# Patient Record
Sex: FEMALE | Race: WHITE | NOT HISPANIC OR LATINO | Employment: UNEMPLOYED | RURAL
[De-identification: names, ages, dates, MRNs, and addresses within clinical notes are randomized per-mention and may not be internally consistent; named-entity substitution may affect disease eponyms.]

---

## 2020-08-19 ENCOUNTER — HISTORICAL (OUTPATIENT)
Dept: ADMINISTRATIVE | Facility: HOSPITAL | Age: 85
End: 2020-08-19

## 2022-10-07 ENCOUNTER — APPOINTMENT (OUTPATIENT)
Dept: RADIOLOGY | Facility: CLINIC | Age: 87
End: 2022-10-07
Attending: NURSE PRACTITIONER
Payer: MEDICARE

## 2022-10-07 ENCOUNTER — OFFICE VISIT (OUTPATIENT)
Dept: FAMILY MEDICINE | Facility: CLINIC | Age: 87
End: 2022-10-07
Payer: MEDICARE

## 2022-10-07 VITALS
HEART RATE: 66 BPM | WEIGHT: 117 LBS | RESPIRATION RATE: 18 BRPM | DIASTOLIC BLOOD PRESSURE: 71 MMHG | BODY MASS INDEX: 21.53 KG/M2 | HEIGHT: 62 IN | SYSTOLIC BLOOD PRESSURE: 125 MMHG | TEMPERATURE: 98 F | OXYGEN SATURATION: 96 %

## 2022-10-07 DIAGNOSIS — R93.0 ABNORMAL X-RAY OF PARANASAL SINUS: ICD-10-CM

## 2022-10-07 DIAGNOSIS — R51.9 NONINTRACTABLE HEADACHE, UNSPECIFIED CHRONICITY PATTERN, UNSPECIFIED HEADACHE TYPE: ICD-10-CM

## 2022-10-07 DIAGNOSIS — J32.9 CHRONIC CONGESTION OF PARANASAL SINUS: ICD-10-CM

## 2022-10-07 DIAGNOSIS — R51.9 NONINTRACTABLE HEADACHE, UNSPECIFIED CHRONICITY PATTERN, UNSPECIFIED HEADACHE TYPE: Primary | ICD-10-CM

## 2022-10-07 PROBLEM — F41.9 ANXIETY: Status: ACTIVE | Noted: 2022-10-07

## 2022-10-07 PROBLEM — M19.90 UNSPECIFIED OSTEOARTHRITIS, UNSPECIFIED SITE: Status: ACTIVE | Noted: 2022-10-07

## 2022-10-07 PROBLEM — I10 ESSENTIAL HYPERTENSION: Status: ACTIVE | Noted: 2022-10-07

## 2022-10-07 PROBLEM — M81.0 AGE-RELATED OSTEOPOROSIS WITHOUT CURRENT PATHOLOGICAL FRACTURE: Status: ACTIVE | Noted: 2022-10-07

## 2022-10-07 PROCEDURE — 70220 X-RAY EXAM OF SINUSES: CPT | Mod: TC,RHCUB | Performed by: NURSE PRACTITIONER

## 2022-10-07 PROCEDURE — 99203 OFFICE O/P NEW LOW 30 MIN: CPT | Mod: ,,, | Performed by: NURSE PRACTITIONER

## 2022-10-07 PROCEDURE — 70220 X-RAY EXAM OF SINUSES: CPT | Mod: 26,,, | Performed by: RADIOLOGY

## 2022-10-07 PROCEDURE — 70220 XR SINUSES MIN 3 VIEWS: ICD-10-PCS | Mod: 26,,, | Performed by: RADIOLOGY

## 2022-10-07 PROCEDURE — 99203 PR OFFICE/OUTPT VISIT, NEW, LEVL III, 30-44 MIN: ICD-10-PCS | Mod: ,,, | Performed by: NURSE PRACTITIONER

## 2022-10-07 RX ORDER — DENOSUMAB 60 MG/ML
INJECTION SUBCUTANEOUS
COMMUNITY
Start: 2022-09-16

## 2022-10-07 RX ORDER — PREDNISONE 10 MG/1
10 TABLET ORAL DAILY
Qty: 5 TABLET | Refills: 0 | Status: SHIPPED | OUTPATIENT
Start: 2022-10-07 | End: 2022-10-12

## 2022-10-07 RX ORDER — ASPIRIN 81 MG/1
81 TABLET ORAL
COMMUNITY

## 2022-10-07 RX ORDER — OMEPRAZOLE 40 MG/1
40 CAPSULE, DELAYED RELEASE ORAL DAILY
COMMUNITY
Start: 2022-09-30

## 2022-10-07 RX ORDER — ZOLPIDEM TARTRATE 5 MG/1
TABLET ORAL
COMMUNITY
Start: 2022-07-14

## 2022-10-07 RX ORDER — POTASSIUM CHLORIDE 20 MEQ/1
20 TABLET, EXTENDED RELEASE ORAL DAILY
COMMUNITY
Start: 2022-08-16

## 2022-10-07 RX ORDER — APIXABAN 2.5 MG/1
2.5 TABLET, FILM COATED ORAL 2 TIMES DAILY
COMMUNITY
Start: 2022-08-02

## 2022-10-07 RX ORDER — CALCIUM CARBONATE 600 MG
TABLET ORAL
COMMUNITY
Start: 2021-10-26

## 2022-10-07 RX ORDER — CEFDINIR 300 MG/1
300 CAPSULE ORAL 2 TIMES DAILY
Qty: 20 CAPSULE | Refills: 0 | Status: SHIPPED | OUTPATIENT
Start: 2022-10-07 | End: 2022-10-17

## 2022-10-07 RX ORDER — TOLTERODINE 2 MG/1
2 CAPSULE, EXTENDED RELEASE ORAL DAILY
COMMUNITY
Start: 2022-10-07

## 2022-10-07 RX ORDER — SERTRALINE HYDROCHLORIDE 100 MG/1
100 TABLET, FILM COATED ORAL DAILY
COMMUNITY
Start: 2022-08-31

## 2022-10-07 RX ORDER — ATORVASTATIN CALCIUM 40 MG/1
40 TABLET, FILM COATED ORAL DAILY
COMMUNITY
Start: 2022-06-15

## 2022-10-07 RX ORDER — AMLODIPINE BESYLATE 10 MG/1
10 TABLET ORAL DAILY
COMMUNITY
Start: 2022-09-30

## 2022-10-07 NOTE — PROGRESS NOTES
"Subjective:       Patient ID: Zenobia Rhodes is a 88 y.o. female.    Chief Complaint: Neck Pain (Neck pain and headache for several months.  States she has had MRI)    Presents to clinic as above. States Dr. Razo is her PCP. Has had neck pain and headaches with chronic sinusitis. She has had MRI's of neck and everything is normal.     Review of Systems   Constitutional: Negative.    HENT:  Positive for congestion and sinus pain. Negative for ear pain and sore throat.    Respiratory: Negative.     Cardiovascular: Negative.    Musculoskeletal:  Positive for myalgias and neck pain. Negative for back pain, falls and joint pain.   Neurological: Negative.         Reviewed family, medical, surgical, and social history.    Objective:      /71   Pulse 66   Temp 97.5 °F (36.4 °C)   Resp 18   Ht 5' 2" (1.575 m)   Wt 53.1 kg (117 lb)   SpO2 96%   BMI 21.40 kg/m²   Physical Exam  Vitals and nursing note reviewed.   Constitutional:       General: She is not in acute distress.     Appearance: Normal appearance. She is normal weight. She is not ill-appearing, toxic-appearing or diaphoretic.   HENT:      Head: Normocephalic.      Right Ear: Tympanic membrane, ear canal and external ear normal. There is no impacted cerumen.      Left Ear: Tympanic membrane, ear canal and external ear normal. There is no impacted cerumen.      Nose: Mucosal edema, congestion and rhinorrhea present. Rhinorrhea is clear.      Right Turbinates: Enlarged and swollen.      Left Turbinates: Enlarged and swollen.      Right Sinus: No maxillary sinus tenderness or frontal sinus tenderness.      Left Sinus: No maxillary sinus tenderness or frontal sinus tenderness.      Mouth/Throat:      Mouth: Mucous membranes are moist.      Pharynx: No oropharyngeal exudate or posterior oropharyngeal erythema.   Neck:      Vascular: No carotid bruit.   Cardiovascular:      Rate and Rhythm: Normal rate and regular rhythm.      Heart sounds: Normal heart " sounds.   Pulmonary:      Effort: Pulmonary effort is normal.      Breath sounds: Normal breath sounds.   Musculoskeletal:      Cervical back: Neck supple. Spasms and tenderness present. No swelling, edema, deformity, erythema, signs of trauma, lacerations, rigidity, torticollis, bony tenderness or crepitus. Pain with movement present. Decreased range of motion.      Comments: Pain with ROM cervical spine.    Lymphadenopathy:      Cervical: No cervical adenopathy.   Skin:     General: Skin is warm and dry.      Capillary Refill: Capillary refill takes less than 2 seconds.   Neurological:      Mental Status: She is alert and oriented to person, place, and time.   Psychiatric:         Mood and Affect: Mood normal.         Behavior: Behavior normal.         Thought Content: Thought content normal.         Judgment: Judgment normal.          No visits with results within 1 Day(s) from this visit.   Latest known visit with results is:   Lab Requisition on 05/18/2021   Component Date Value Ref Range Status    Color, UA 05/18/2021 Yellow  Colorless, Straw, Yellow, Dark Yellow Final    Clarity, UA 05/18/2021 Clear  Clear Final    pH, UA 05/18/2021 7.0  5.0, 5.5, 6.0, 6.5, 7.0, 7.5, 8.0 pH Units Final    Leukocytes, UA 05/18/2021 Negative  Negative Final    Nitrites, UA 05/18/2021 Negative  Negative Final    Protein, UA 05/18/2021 Negative  Negative Final    Glucose, UA 05/18/2021 Negative  Negative mg/dL Final    Ketones, UA 05/18/2021 Negative  Negative, Trace mg/dL Final    Urobilinogen, UA 05/18/2021 0.2  0.2, 1.0 mg/dL Final    Bilirubin, UA 05/18/2021 Negative  Negative Final    Blood, UA 05/18/2021 Negative  Negative Final    Specific Gravity, UA 05/18/2021 1.010  <=1.005, 1.010, 1.015, 1.020, 1.025, 1.030 Final    Culture, Urine 05/18/2021 6,000 Proteus vulgaris (A)   Final    Culture, Urine 05/18/2021 6,000 Escherichia coli (A)   Final      Assessment:       1. Nonintractable headache, unspecified chronicity  pattern, unspecified headache type    2. Chronic congestion of paranasal sinus    3. Abnormal x-ray of paranasal sinus        Plan:       Nonintractable headache, unspecified chronicity pattern, unspecified headache type  -     X-Ray Sinuses 3 or more views; Future; Expected date: 10/07/2022  -     cefdinir (OMNICEF) 300 MG capsule; Take 1 capsule (300 mg total) by mouth 2 (two) times daily. for 10 days  Dispense: 20 capsule; Refill: 0  -     Ambulatory referral/consult to ENT; Future; Expected date: 10/14/2022  -     CT Sinuses without Contrast; Future; Expected date: 10/07/2022    Chronic congestion of paranasal sinus  -     X-Ray Sinuses 3 or more views; Future; Expected date: 10/07/2022  -     cefdinir (OMNICEF) 300 MG capsule; Take 1 capsule (300 mg total) by mouth 2 (two) times daily. for 10 days  Dispense: 20 capsule; Refill: 0  -     predniSONE (DELTASONE) 10 MG tablet; Take 1 tablet (10 mg total) by mouth once daily. for 5 days  Dispense: 5 tablet; Refill: 0  -     Ambulatory referral/consult to ENT; Future; Expected date: 10/14/2022  -     CT Sinuses without Contrast; Future; Expected date: 10/07/2022    Abnormal x-ray of paranasal sinus  -     CT Sinuses without Contrast; Future; Expected date: 10/07/2022  I will call with xray results  F/U with referral clerk in 1 week  RTC PRN          Risks, benefits, and side effects were discussed with the patient. All questions were answered to the fullest satisfaction of the patient, and pt verbalized understanding and agreement to treatment plan. Pt was to call with any new or worsening symptoms, or present to the ER.

## 2022-10-08 NOTE — PROGRESS NOTES
Notify calcified structure in left frontal sinus. I am referring to ENT and getting a CT of sinuses. F/U with referral clerk in 1 week. Very important to keep these appointments so that we can possibly get some answers.

## 2022-10-12 ENCOUNTER — TELEPHONE (OUTPATIENT)
Dept: FAMILY MEDICINE | Facility: CLINIC | Age: 87
End: 2022-10-12
Payer: MEDICARE

## 2022-10-12 NOTE — TELEPHONE ENCOUNTER
Pt notified. Voiced understanding.----- Message from FLORECITA Keller sent at 10/7/2022  7:53 PM CDT -----  Notify calcified structure in left frontal sinus. I am referring to ENT and getting a CT of sinuses. F/U with referral clerk in 1 week. Very important to keep these appointments so that we can possibly get some answers.

## 2022-10-13 ENCOUNTER — OFFICE VISIT (OUTPATIENT)
Dept: OTOLARYNGOLOGY | Facility: CLINIC | Age: 87
End: 2022-10-13
Payer: MEDICARE

## 2022-10-13 VITALS — BODY MASS INDEX: 21.53 KG/M2 | WEIGHT: 117 LBS | HEIGHT: 62 IN

## 2022-10-13 DIAGNOSIS — H90.3 SENSORINEURAL HEARING LOSS (SNHL) OF BOTH EARS: Primary | ICD-10-CM

## 2022-10-13 DIAGNOSIS — R51.9 NONINTRACTABLE HEADACHE, UNSPECIFIED CHRONICITY PATTERN, UNSPECIFIED HEADACHE TYPE: ICD-10-CM

## 2022-10-13 DIAGNOSIS — J32.9 CHRONIC CONGESTION OF PARANASAL SINUS: ICD-10-CM

## 2022-10-13 PROCEDURE — 99204 OFFICE O/P NEW MOD 45 MIN: CPT | Mod: S$PBB,,, | Performed by: OTOLARYNGOLOGY

## 2022-10-13 PROCEDURE — 99204 PR OFFICE/OUTPT VISIT, NEW, LEVL IV, 45-59 MIN: ICD-10-PCS | Mod: S$PBB,,, | Performed by: OTOLARYNGOLOGY

## 2022-10-13 PROCEDURE — 99214 OFFICE O/P EST MOD 30 MIN: CPT | Mod: PBBFAC | Performed by: OTOLARYNGOLOGY

## 2022-10-13 NOTE — PROGRESS NOTES
Subjective:       Patient ID: Zenobia Rhodes is a 88 y.o. female.    Chief Complaint: Sinus Problem (Patient presents for a sinus infection. )    Sinus Problem  Associated symptoms include headaches and sinus pressure.   Review of Systems   HENT:  Positive for sinus pressure and sinus pain.    Neurological:  Positive for headaches.   All other systems reviewed and are negative.    Objective:      Physical Exam  General: NAD  Head: Normocephalic, atraumatic, no facial asymmetry/normal strength,  Ears: Both auricules normal in appearance, w/o deformities tympanic membranes normal external auditory canals normal  Nose: External nose w/o deformities normal turbinates no drainage or inflammation  Oral Cavity: Lips, gums, floor of mouth, tongue hard palate, and buccal mucosa without mass/lesion  Oropharynx: Mucosa pink and moist, soft palate, posterior pharynx and oropharyngeal wall without mass/lesion  Neck: Supple, symmetric, trachea midline, no palpable mass/lesion, no palpable cervical lymphadenopathy  Skin: Warm and dry, no concerning lesions  Respiratory: Respirations even, unlabored   Assessment:       1. Sensorineural hearing loss (SNHL) of both ears    2. Nonintractable headache, unspecified chronicity pattern, unspecified headache type    3. Chronic congestion of paranasal sinus          Plan:       Await CT sinus f/u after above

## 2022-10-17 ENCOUNTER — HOSPITAL ENCOUNTER (OUTPATIENT)
Dept: RADIOLOGY | Facility: HOSPITAL | Age: 87
Discharge: HOME | End: 2022-10-17
Attending: NURSE PRACTITIONER
Payer: MEDICARE

## 2022-10-17 ENCOUNTER — OFFICE VISIT (OUTPATIENT)
Dept: OTOLARYNGOLOGY | Facility: CLINIC | Age: 87
End: 2022-10-17
Payer: MEDICARE

## 2022-10-17 VITALS — BODY MASS INDEX: 21.53 KG/M2 | WEIGHT: 117 LBS | HEIGHT: 62 IN

## 2022-10-17 DIAGNOSIS — Z76.89 ENCOUNTER TO ESTABLISH CARE: Primary | ICD-10-CM

## 2022-10-17 DIAGNOSIS — G89.29 CHRONIC INTRACTABLE HEADACHE, UNSPECIFIED HEADACHE TYPE: ICD-10-CM

## 2022-10-17 DIAGNOSIS — R51.9 NONINTRACTABLE HEADACHE, UNSPECIFIED CHRONICITY PATTERN, UNSPECIFIED HEADACHE TYPE: ICD-10-CM

## 2022-10-17 DIAGNOSIS — J32.9 CHRONIC CONGESTION OF PARANASAL SINUS: ICD-10-CM

## 2022-10-17 DIAGNOSIS — R51.9 CHRONIC INTRACTABLE HEADACHE, UNSPECIFIED HEADACHE TYPE: ICD-10-CM

## 2022-10-17 DIAGNOSIS — R93.0 ABNORMAL X-RAY OF PARANASAL SINUS: ICD-10-CM

## 2022-10-17 DIAGNOSIS — J32.8 OTHER CHRONIC SINUSITIS: ICD-10-CM

## 2022-10-17 PROCEDURE — 99213 OFFICE O/P EST LOW 20 MIN: CPT | Mod: PBBFAC,25 | Performed by: OTOLARYNGOLOGY

## 2022-10-17 PROCEDURE — 70486 CT MAXILLOFACIAL W/O DYE: CPT | Mod: TC

## 2022-10-17 PROCEDURE — 99214 OFFICE O/P EST MOD 30 MIN: CPT | Mod: S$PBB,,, | Performed by: OTOLARYNGOLOGY

## 2022-10-17 PROCEDURE — 99214 PR OFFICE/OUTPT VISIT, EST, LEVL IV, 30-39 MIN: ICD-10-PCS | Mod: S$PBB,,, | Performed by: OTOLARYNGOLOGY

## 2022-10-17 PROCEDURE — 70486 CT MAXILLOFACIAL W/O DYE: CPT | Mod: 26,,, | Performed by: RADIOLOGY

## 2022-10-17 PROCEDURE — 70486 CT SINUSES WITHOUT CONTRAST: ICD-10-PCS | Mod: 26,,, | Performed by: RADIOLOGY

## 2022-10-17 NOTE — PROGRESS NOTES
Subjective:       Patient ID: Zenobia Rhodes is a 88 y.o. female.    Chief Complaint: Follow-up (Patient presents for CT sinus results. )    HPI  Review of Systems   Constitutional:  Negative for chills, fatigue and fever.   HENT:  Positive for nasal congestion. Negative for ear discharge and ear pain.    Neurological:  Positive for headaches.   All other systems reviewed and are negative.      Objective:      Physical Exam  Constitutional:       Appearance: Normal appearance.   HENT:      Head: Normocephalic.      Right Ear: Tympanic membrane, ear canal and external ear normal.      Left Ear: Tympanic membrane, ear canal and external ear normal.      Nose: Nose normal.      Mouth/Throat:      Lips: Pink.      Mouth: Mucous membranes are moist.      Pharynx: Oropharynx is clear.   Eyes:      Pupils: Pupils are equal, round, and reactive to light.   Pulmonary:      Effort: Pulmonary effort is normal.   Musculoskeletal:         General: Normal range of motion.   Skin:     General: Skin is warm and dry.   Neurological:      Mental Status: She is alert and oriented to person, place, and time.   Psychiatric:         Mood and Affect: Mood normal.         Behavior: Behavior is cooperative.       Assessment:       Problem List Items Addressed This Visit    None  Visit Diagnoses       Encounter to establish care    -  Primary    Relevant Orders    Ambulatory referral/consult to Neurology    Chronic intractable headache, unspecified headache type        Relevant Orders    Ambulatory referral/consult to Neurology    Other chronic sinusitis                Plan:   Reviewed CT. Old scarring from previous surgery left frontal sinus   Refer to neuro for chronic headaches.  Follow up as needed.

## 2022-11-09 ENCOUNTER — OFFICE VISIT (OUTPATIENT)
Dept: NEUROLOGY | Facility: CLINIC | Age: 87
End: 2022-11-09
Payer: MEDICARE

## 2022-11-09 VITALS
DIASTOLIC BLOOD PRESSURE: 78 MMHG | SYSTOLIC BLOOD PRESSURE: 120 MMHG | WEIGHT: 120 LBS | BODY MASS INDEX: 22.08 KG/M2 | HEART RATE: 75 BPM | OXYGEN SATURATION: 99 % | HEIGHT: 62 IN

## 2022-11-09 DIAGNOSIS — G89.29 CHRONIC INTRACTABLE HEADACHE, UNSPECIFIED HEADACHE TYPE: Primary | ICD-10-CM

## 2022-11-09 DIAGNOSIS — Z76.89 ENCOUNTER TO ESTABLISH CARE: ICD-10-CM

## 2022-11-09 DIAGNOSIS — R51.9 CHRONIC INTRACTABLE HEADACHE, UNSPECIFIED HEADACHE TYPE: Primary | ICD-10-CM

## 2022-11-09 PROCEDURE — 99215 OFFICE O/P EST HI 40 MIN: CPT | Mod: PBBFAC | Performed by: PSYCHIATRY & NEUROLOGY

## 2022-11-09 PROCEDURE — 99204 PR OFFICE/OUTPT VISIT, NEW, LEVL IV, 45-59 MIN: ICD-10-PCS | Mod: S$PBB,,, | Performed by: PSYCHIATRY & NEUROLOGY

## 2022-11-09 PROCEDURE — 99204 OFFICE O/P NEW MOD 45 MIN: CPT | Mod: S$PBB,,, | Performed by: PSYCHIATRY & NEUROLOGY

## 2022-11-09 RX ORDER — KETOROLAC TROMETHAMINE 10 MG/1
10 TABLET, FILM COATED ORAL EVERY 6 HOURS PRN
Qty: 20 TABLET | Refills: 0 | Status: SHIPPED | OUTPATIENT
Start: 2022-11-09 | End: 2022-11-14

## 2022-11-09 NOTE — PATIENT INSTRUCTIONS
Excellent family support network from daughter   Complete course Abx   Caution w/ excess OTC meds - consider tapering off Ambien  Trial Toradol x 4-5 days  F/u 3 months

## 2022-11-09 NOTE — PROGRESS NOTES
Subjective:       Patient ID: Zenobai Rhodes is a 88 y.o. female     Chief Complaint:    Chief Complaint   Patient presents with    Headache        Allergies:  Patient has no known allergies.    Current Medications:    Outpatient Encounter Medications as of 11/9/2022   Medication Sig Dispense Refill    amLODIPine (NORVASC) 10 MG tablet Take 10 mg by mouth once daily.      aspirin (ECOTRIN) 81 MG EC tablet Take 81 mg by mouth.      atorvastatin (LIPITOR) 40 MG tablet Take 40 mg by mouth once daily.      calcium carbonate (OS-ERICKA) 600 mg calcium (1,500 mg) Tab Calcium Carbonate 600 MG Oral Tablet QTY: 0 tablet Days: 0 Refills: 0  Written: 10/26/21 Patient Instructions: Two po qd      denosumab (PROLIA) 60 mg/mL Syrg Prolia 60 MG/ML Subcutaneous Solution Prefilled Syringe QTY: 0  Days: 0 Refills: 0  Written: 09/16/22 Patient Instructions: twice a year      ELIQUIS 2.5 mg Tab Take 2.5 mg by mouth 2 (two) times daily.      omeprazole (PRILOSEC) 40 MG capsule Take 40 mg by mouth once daily.      potassium chloride SA (K-DUR,KLOR-CON) 20 MEQ tablet Take 20 mEq by mouth once daily.      sertraline (ZOLOFT) 100 MG tablet Take 100 mg by mouth once daily.      tolterodine (DETROL LA) 2 MG Cp24 Take 2 mg by mouth once daily.      zolpidem (AMBIEN) 5 MG Tab Take by mouth.       No facility-administered encounter medications on file as of 11/9/2022.       History of Present Illness  87 yo WF s/p stroke 15 mos ago and referred for HEADACHE's generalized in nature and several months worsening   Has only taken OTC meds w/ mod success   Now being treated for sinus infection but CT sinus and head showed L frontal sinus opacification and started on ABx       History reviewed. No pertinent past medical history.    History reviewed. No pertinent surgical history.    Social History  Ms. Rhodes  reports that she has never smoked. She has never been exposed to tobacco smoke. She has never used smokeless tobacco. She reports that  "she does not currently use alcohol. She reports that she does not use drugs.    Family History  Ms.'donna Rhodes family history is not on file.    Review of Systems  Review of Systems   All other systems reviewed and are negative.   Objective:   /78 (BP Location: Left arm, Patient Position: Sitting, BP Method: Medium (Manual))   Pulse 75   Ht 5' 2" (1.575 m)   Wt 54.4 kg (120 lb)   SpO2 99%   BMI 21.95 kg/m²    NEUROLOGICAL EXAMINATION:     MENTAL STATUS   Oriented to person, place, and time.   Attention: decreased. Concentration: decreased.   Speech: speech is normal   Level of consciousness: drowsy  Knowledge: good.     CRANIAL NERVES   Cranial nerves II through XII intact.     MOTOR EXAM     Strength   Strength 5/5 throughout.     GAIT AND COORDINATION        Uses walker       Physical Exam  Vitals reviewed.   Neurological:      General: No focal deficit present.      Mental Status: She is alert and oriented to person, place, and time. Mental status is at baseline.      Cranial Nerves: Cranial nerves 2-12 are intact.      Motor: Motor strength is normal.   Psychiatric:         Speech: Speech normal.        Assessment:     Chronic intractable headache, unspecified headache type  -     Ambulatory referral/consult to Neurology    Encounter to establish care  -     Ambulatory referral/consult to Neurology       Primary Diagnosis and ICD10  Chronic intractable headache, unspecified headache type [R51.9, G89.29]    Plan:     Patient Instructions   Excellent family support network from daughter   Complete course Abx   Caution w/ excess OTC meds - consider tapering off Ambien  Trial Toradol x 4-5 days  F/u 3 months     There are no discontinued medications.    Requested Prescriptions      No prescriptions requested or ordered in this encounter         "

## 2023-02-28 ENCOUNTER — OFFICE VISIT (OUTPATIENT)
Dept: OTOLARYNGOLOGY | Facility: CLINIC | Age: 88
End: 2023-02-28
Payer: MEDICARE

## 2023-02-28 VITALS — BODY MASS INDEX: 22.08 KG/M2 | WEIGHT: 120 LBS | HEIGHT: 62 IN

## 2023-02-28 DIAGNOSIS — J32.9 CHRONIC CONGESTION OF PARANASAL SINUS: Primary | ICD-10-CM

## 2023-02-28 DIAGNOSIS — R09.81 CHRONIC NASAL CONGESTION: ICD-10-CM

## 2023-02-28 PROCEDURE — 99213 OFFICE O/P EST LOW 20 MIN: CPT | Mod: PBBFAC | Performed by: OTOLARYNGOLOGY

## 2023-02-28 PROCEDURE — 99214 PR OFFICE/OUTPT VISIT, EST, LEVL IV, 30-39 MIN: ICD-10-PCS | Mod: S$PBB,,, | Performed by: OTOLARYNGOLOGY

## 2023-02-28 PROCEDURE — 99214 OFFICE O/P EST MOD 30 MIN: CPT | Mod: S$PBB,,, | Performed by: OTOLARYNGOLOGY

## 2023-02-28 RX ORDER — AZELASTINE 1 MG/ML
2 SPRAY, METERED NASAL 2 TIMES DAILY
Qty: 30 ML | Refills: 1 | Status: SHIPPED | OUTPATIENT
Start: 2023-02-28 | End: 2023-06-17

## 2023-02-28 NOTE — PROGRESS NOTES
Subjective:       Patient ID: Zenobia Rhodes is a 89 y.o. female.    Chief Complaint: Sinus Problem (Patient presents for sinus pressure. )    HPI  Review of Systems   Constitutional:  Negative for chills, fatigue and fever.   HENT:  Positive for nasal congestion and sinus pressure/congestion.    Respiratory:  Positive for cough.        Objective:      Physical Exam  Constitutional:       Appearance: Normal appearance.   HENT:      Head: Normocephalic.      Right Ear: Tympanic membrane, ear canal and external ear normal.      Left Ear: Tympanic membrane, ear canal and external ear normal.      Nose: Nose normal.      Mouth/Throat:      Lips: Pink.      Mouth: Mucous membranes are moist.      Pharynx: Oropharynx is clear.   Eyes:      Pupils: Pupils are equal, round, and reactive to light.   Pulmonary:      Effort: Pulmonary effort is normal.   Skin:     General: Skin is warm and dry.   Neurological:      Mental Status: She is alert and oriented to person, place, and time.   Psychiatric:         Mood and Affect: Mood normal.         Behavior: Behavior is cooperative.       Assessment:       Problem List Items Addressed This Visit    None  Visit Diagnoses       Chronic congestion of paranasal sinus    -  Primary    Relevant Medications    azelastine (ASTELIN) 137 mcg (0.1 %) nasal spray    Chronic nasal congestion                Plan:   Stop Sudafed  Astelin  Follow up in 2-3 weeks.

## 2024-08-16 DIAGNOSIS — R41.0 CONFUSED: ICD-10-CM

## 2024-08-16 DIAGNOSIS — Z86.73 HISTORY OF CVA (CEREBROVASCULAR ACCIDENT): Primary | ICD-10-CM

## 2024-10-11 ENCOUNTER — OFFICE VISIT (OUTPATIENT)
Dept: NEUROLOGY | Facility: CLINIC | Age: 89
End: 2024-10-11
Payer: MEDICARE

## 2024-10-11 VITALS
WEIGHT: 110 LBS | HEART RATE: 65 BPM | BODY MASS INDEX: 20.24 KG/M2 | HEIGHT: 62 IN | SYSTOLIC BLOOD PRESSURE: 102 MMHG | RESPIRATION RATE: 16 BRPM | DIASTOLIC BLOOD PRESSURE: 70 MMHG | OXYGEN SATURATION: 97 %

## 2024-10-11 DIAGNOSIS — R41.3 MEMORY CHANGES: Primary | ICD-10-CM

## 2024-10-11 DIAGNOSIS — Z86.73 HISTORY OF CVA (CEREBROVASCULAR ACCIDENT): ICD-10-CM

## 2024-10-11 DIAGNOSIS — F41.9 ANXIETY: ICD-10-CM

## 2024-10-11 PROCEDURE — 99214 OFFICE O/P EST MOD 30 MIN: CPT | Mod: S$PBB,,, | Performed by: PSYCHIATRY & NEUROLOGY

## 2024-10-11 PROCEDURE — 99999 PR PBB SHADOW E&M-EST. PATIENT-LVL V: CPT | Mod: PBBFAC,,, | Performed by: PSYCHIATRY & NEUROLOGY

## 2024-10-11 PROCEDURE — 99215 OFFICE O/P EST HI 40 MIN: CPT | Mod: PBBFAC | Performed by: PSYCHIATRY & NEUROLOGY

## 2024-10-11 RX ORDER — DONEPEZIL HYDROCHLORIDE 5 MG/1
5 TABLET, FILM COATED ORAL DAILY
Qty: 90 TABLET | Refills: 3 | Status: SHIPPED | OUTPATIENT
Start: 2024-10-11

## 2024-10-11 RX ORDER — QUETIAPINE FUMARATE 25 MG/1
25 TABLET, FILM COATED ORAL DAILY
COMMUNITY
End: 2024-10-11

## 2024-10-11 RX ORDER — IBUPROFEN 100 MG/5ML
SUSPENSION, ORAL (FINAL DOSE FORM) ORAL
COMMUNITY
Start: 2022-09-16

## 2024-10-11 NOTE — PATIENT INSTRUCTIONS
Excellent family support from daughter   Will offer trial of memory aids Aricept 5 mg daily   Stop Seroquel as not  tolerating   Cont Ambien but only by itself   F/u 3 months

## 2024-10-11 NOTE — PROGRESS NOTES
"    Subjective:       Patient ID: Zenobia Rhodes is a 89 y.o. female     Chief Complaint:    Chief Complaint   Patient presents with    memory / headache     Pt. States confusion sometimes.pt. states pain in eyes.        Allergies:  Patient has no known allergies.    Current Medications:    Outpatient Encounter Medications as of 10/11/2024   Medication Sig Dispense Refill    amLODIPine (NORVASC) 10 MG tablet Take 10 mg by mouth once daily.      aspirin (ECOTRIN) 81 MG EC tablet Take 81 mg by mouth.      atorvastatin (LIPITOR) 40 MG tablet Take 40 mg by mouth once daily.      azelastine (ASTELIN) 137 mcg (0.1 %) nasal spray 2 sprays (274 mcg total) by Nasal route 2 (two) times daily. 30 mL 1    calcium carbonate (OS-ERICKA) 600 mg calcium (1,500 mg) Tab Calcium Carbonate 600 MG Oral Tablet QTY: 0 tablet Days: 0 Refills: 0  Written: 10/26/21 Patient Instructions: Two po qd      denosumab (PROLIA) 60 mg/mL Syrg Prolia 60 MG/ML Subcutaneous Solution Prefilled Syringe QTY: 0  Days: 0 Refills: 0  Written: 09/16/22 Patient Instructions: twice a year      ELIQUIS 2.5 mg Tab Take 2.5 mg by mouth 2 (two) times daily.      omeprazole (PRILOSEC) 40 MG capsule Take 40 mg by mouth once daily.      potassium chloride SA (K-DUR,KLOR-CON) 20 MEQ tablet Take 20 mEq by mouth once daily.      sertraline (ZOLOFT) 100 MG tablet Take 100 mg by mouth once daily.      tolterodine (DETROL LA) 2 MG Cp24 Take 2 mg by mouth once daily.      zolpidem (AMBIEN) 5 MG Tab Take by mouth.       No facility-administered encounter medications on file as of 10/11/2024.       History of Present Illness  89 yo WF in clinic for eval of worsening confusion poss dementia and understandable given age   She has been confused recently and "sundowning" at night wandering outside   S/p stroke 3 yrs ago and seen in this clinic 2 yrs ago for HEADACHE's relieved after Toradol injec- never followed up   Excellent support form daughter who has noticed short term " "memory issues   Trial of Ambien has helped her sleep when taken by itself however Seroquel by itself caused incr confusion and dizziness   She is also having hallucinations seeing and hearing people who are not there         History reviewed. No pertinent past medical history.    History reviewed. No pertinent surgical history.    Social History  Ms. Rhodes  reports that she has never smoked. She has never been exposed to tobacco smoke. She has never used smokeless tobacco. She reports that she does not currently use alcohol. She reports that she does not use drugs.    Family History  Ms.'s Rhodes family history is not on file.    Review of Systems  Review of Systems   Musculoskeletal:  Positive for joint pain.   Neurological:  Positive for weakness.   Psychiatric/Behavioral:  Positive for hallucinations and memory loss. The patient is nervous/anxious.    All other systems reviewed and are negative.     Objective:   /70   Pulse 65   Resp 16   Ht 5' 2" (1.575 m)   Wt 49.9 kg (110 lb)   SpO2 97%   BMI 20.12 kg/m²    NEUROLOGICAL EXAMINATION:     MENTAL STATUS   Oriented to person, place, and time.   Registration: recalls 2 of 3 objects.   Level of consciousness: alert  Knowledge: consistent with education.        Knew POTUS  Poor serial 7's      CRANIAL NERVES   Cranial nerves II through XII intact.     MOTOR EXAM     Strength   Strength 5/5 throughout.     GAIT AND COORDINATION        Slow using walker         Physical Exam  Vitals reviewed.   Constitutional:       Appearance: She is normal weight.   Neurological:      Mental Status: She is alert and oriented to person, place, and time. Mental status is at baseline.      Cranial Nerves: Cranial nerves 2-12 are intact.      Motor: Motor strength is normal.         Assessment:     Memory changes  Comments:  poss dementia    History of CVA (cerebrovascular accident)  -     Ambulatory referral/consult to Neurology    Anxiety         Primary Diagnosis " and ICD10  Memory changes [R41.3]    Plan:     There are no Patient Instructions on file for this visit.    There are no discontinued medications.    Requested Prescriptions      No prescriptions requested or ordered in this encounter

## 2025-01-13 ENCOUNTER — OFFICE VISIT (OUTPATIENT)
Dept: NEUROLOGY | Facility: CLINIC | Age: OVER 89
End: 2025-01-13
Payer: MEDICARE

## 2025-01-13 VITALS
HEIGHT: 62 IN | WEIGHT: 110 LBS | OXYGEN SATURATION: 95 % | DIASTOLIC BLOOD PRESSURE: 86 MMHG | RESPIRATION RATE: 18 BRPM | BODY MASS INDEX: 20.24 KG/M2 | SYSTOLIC BLOOD PRESSURE: 132 MMHG | HEART RATE: 72 BPM

## 2025-01-13 DIAGNOSIS — F03.90 DEMENTIA, UNSPECIFIED DEMENTIA SEVERITY, UNSPECIFIED DEMENTIA TYPE, UNSPECIFIED WHETHER BEHAVIORAL, PSYCHOTIC, OR MOOD DISTURBANCE OR ANXIETY: Primary | ICD-10-CM

## 2025-01-13 DIAGNOSIS — F41.9 ANXIETY: ICD-10-CM

## 2025-01-13 PROCEDURE — 99999 PR PBB SHADOW E&M-EST. PATIENT-LVL V: CPT | Mod: PBBFAC,,, | Performed by: PSYCHIATRY & NEUROLOGY

## 2025-01-13 PROCEDURE — 99215 OFFICE O/P EST HI 40 MIN: CPT | Mod: PBBFAC | Performed by: PSYCHIATRY & NEUROLOGY

## 2025-01-13 PROCEDURE — 99214 OFFICE O/P EST MOD 30 MIN: CPT | Mod: S$PBB,,, | Performed by: PSYCHIATRY & NEUROLOGY

## 2025-01-13 RX ORDER — DONEPEZIL HYDROCHLORIDE 5 MG/1
5 TABLET, FILM COATED ORAL DAILY
Qty: 90 TABLET | Refills: 3 | Status: SHIPPED | OUTPATIENT
Start: 2025-01-13

## 2025-01-13 NOTE — PROGRESS NOTES
Subjective:       Patient ID: Zenobia Rhodes is a 89 y.o. female     Chief Complaint:    Chief Complaint   Patient presents with    Follow-up     Patient is a 3 month follow up r/t movement disorders. Requests Donepezil 5 mg.         Allergies:  Patient has no known allergies.    Current Medications:    Outpatient Encounter Medications as of 1/13/2025   Medication Sig Dispense Refill    amLODIPine (NORVASC) 10 MG tablet Take 10 mg by mouth once daily.      ascorbic acid, vitamin C, (VITAMIN C) 1000 MG tablet Take by oral route.      aspirin (ECOTRIN) 81 MG EC tablet Take 81 mg by mouth.      atorvastatin (LIPITOR) 40 MG tablet Take 40 mg by mouth once daily.      azelastine (ASTELIN) 137 mcg (0.1 %) nasal spray 2 sprays (274 mcg total) by Nasal route 2 (two) times daily. 30 mL 1    calcium carbonate (OS-ERICKA) 600 mg calcium (1,500 mg) Tab Calcium Carbonate 600 MG Oral Tablet QTY: 0 tablet Days: 0 Refills: 0  Written: 10/26/21 Patient Instructions: Two po qd      denosumab (PROLIA) 60 mg/mL Syrg Prolia 60 MG/ML Subcutaneous Solution Prefilled Syringe QTY: 0  Days: 0 Refills: 0  Written: 09/16/22 Patient Instructions: twice a year      donepeziL (ARICEPT) 5 MG tablet Take 1 tablet (5 mg total) by mouth once daily. 90 tablet 3    ELIQUIS 2.5 mg Tab Take 2.5 mg by mouth 2 (two) times daily.      omeprazole (PRILOSEC) 40 MG capsule Take 40 mg by mouth once daily.      potassium chloride SA (K-DUR,KLOR-CON) 20 MEQ tablet Take 20 mEq by mouth once daily.      sertraline (ZOLOFT) 100 MG tablet Take 100 mg by mouth once daily.      tolterodine (DETROL LA) 2 MG Cp24 Take 2 mg by mouth once daily.      zolpidem (AMBIEN) 5 MG Tab Take by mouth.       No facility-administered encounter medications on file as of 1/13/2025.       History of Present Illness  89 yo WF w/ dementia and tolerating Aricept well but has stopped Ambien - Seroquel bedtime but cannot guarantee compliance  Some prev wandering but not  "per daughter currently  Excellent family support network from daughter        History reviewed. No pertinent past medical history.    History reviewed. No pertinent surgical history.    Social History  Ms. Rhodes  reports that she has never smoked. She has never been exposed to tobacco smoke. She has never used smokeless tobacco. She reports that she does not currently use alcohol. She reports that she does not use drugs.    Family History  Ms.'s Rhodes family history is not on file.    Review of Systems  Review of Systems   Psychiatric/Behavioral:  Positive for memory loss.    All other systems reviewed and are negative.   Objective:   /86 (BP Location: Right arm, Patient Position: Sitting)   Pulse 72   Resp 18   Ht 5' 2" (1.575 m)   Wt 49.9 kg (110 lb 0.2 oz)   SpO2 95%   BMI 20.12 kg/m²    NEUROLOGICAL EXAMINATION:     MENTAL STATUS   Level of consciousness: alert  Knowledge: consistent with education.        dementia     CRANIAL NERVES   Cranial nerves II through XII intact.     MOTOR EXAM     Strength   Strength 5/5 throughout.     GAIT AND COORDINATION        Walker at house-wheelchair in public       Physical Exam  Vitals reviewed.   Constitutional:       Appearance: She is normal weight.   Neurological:      Mental Status: She is alert. Mental status is at baseline.      Cranial Nerves: Cranial nerves 2-12 are intact.      Motor: Motor strength is normal.       Assessment:     Anxiety         Primary Diagnosis and ICD10  Anxiety [F41.9]    Plan:     There are no Patient Instructions on file for this visit.    There are no discontinued medications.    Requested Prescriptions      No prescriptions requested or ordered in this encounter       "

## 2025-04-28 ENCOUNTER — OFFICE VISIT (OUTPATIENT)
Dept: NEUROLOGY | Facility: CLINIC | Age: OVER 89
End: 2025-04-28
Payer: MEDICARE

## 2025-04-28 VITALS
BODY MASS INDEX: 17.48 KG/M2 | SYSTOLIC BLOOD PRESSURE: 100 MMHG | HEIGHT: 62 IN | HEART RATE: 67 BPM | RESPIRATION RATE: 18 BRPM | WEIGHT: 95 LBS | OXYGEN SATURATION: 100 % | DIASTOLIC BLOOD PRESSURE: 58 MMHG

## 2025-04-28 DIAGNOSIS — F41.9 ANXIETY: ICD-10-CM

## 2025-04-28 DIAGNOSIS — F03.90 DEMENTIA, UNSPECIFIED DEMENTIA SEVERITY, UNSPECIFIED DEMENTIA TYPE, UNSPECIFIED WHETHER BEHAVIORAL, PSYCHOTIC, OR MOOD DISTURBANCE OR ANXIETY: Primary | ICD-10-CM

## 2025-04-28 PROCEDURE — 99215 OFFICE O/P EST HI 40 MIN: CPT | Mod: PBBFAC | Performed by: PSYCHIATRY & NEUROLOGY

## 2025-04-28 PROCEDURE — 99999 PR PBB SHADOW E&M-EST. PATIENT-LVL V: CPT | Mod: PBBFAC,,, | Performed by: PSYCHIATRY & NEUROLOGY

## 2025-04-28 PROCEDURE — 99214 OFFICE O/P EST MOD 30 MIN: CPT | Mod: S$PBB,,, | Performed by: PSYCHIATRY & NEUROLOGY

## 2025-04-28 RX ORDER — LANOLIN ALCOHOL/MO/W.PET/CERES
1 CREAM (GRAM) TOPICAL
COMMUNITY
Start: 2025-03-25

## 2025-04-28 RX ORDER — OLMESARTAN MEDOXOMIL 40 MG/1
1 TABLET ORAL DAILY
COMMUNITY

## 2025-04-28 RX ORDER — FLUTICASONE PROPIONATE 50 MCG
2 SPRAY, SUSPENSION (ML) NASAL
COMMUNITY
Start: 2022-09-16

## 2025-04-28 RX ORDER — NITROFURANTOIN 25; 75 MG/1; MG/1
100 CAPSULE ORAL
COMMUNITY
Start: 2025-04-25 | End: 2025-05-02

## 2025-04-28 RX ORDER — METOPROLOL TARTRATE 50 MG/1
TABLET ORAL
COMMUNITY
Start: 2025-04-28

## 2025-04-28 RX ORDER — VENLAFAXINE 75 MG/1
75 TABLET ORAL NIGHTLY
COMMUNITY
Start: 2025-04-24

## 2025-04-28 RX ORDER — MULTIVITAMIN
1 TABLET ORAL
COMMUNITY

## 2025-04-28 RX ORDER — QUETIAPINE FUMARATE 25 MG/1
TABLET, FILM COATED ORAL
COMMUNITY
Start: 2025-04-20

## 2025-04-28 RX ORDER — CHOLECALCIFEROL (VITAMIN D3) 25 MCG
1000 TABLET ORAL
COMMUNITY

## 2025-04-28 RX ORDER — FUROSEMIDE 40 MG/1
40 TABLET ORAL
COMMUNITY
Start: 2025-03-26

## 2025-04-28 RX ORDER — DILTIAZEM HYDROCHLORIDE 120 MG/1
120 CAPSULE, COATED, EXTENDED RELEASE ORAL
COMMUNITY
Start: 2025-03-26

## 2025-04-28 RX ORDER — VENLAFAXINE HYDROCHLORIDE 150 MG/1
CAPSULE, EXTENDED RELEASE ORAL
COMMUNITY
Start: 2025-04-17

## 2025-04-28 NOTE — PATIENT INSTRUCTIONS
Cont Aricpet 5 mg daily   Cont Seroquel bedtime   Caution w/ excess meds   Supplement w/ protein   Do PT/Rehab as tolerated   F/u 6 [facility]

## 2025-04-28 NOTE — PROGRESS NOTES
"    Subjective:       Patient ID: Zenobia Rhodes is a 89 y.o. female     Chief Complaint:    Chief Complaint   Patient presents with    Dementia     Pt. States doing good.        Allergies:  Patient has no known allergies.    Current Medications:  Encounter Medications[1]    History of Present Illness  89 yo WF w/ dementia but more controlled behaviors   Recent hospitalization for UTI and sepsis and now on Abx  Tolerating memory aids and Seroquel        History reviewed. No pertinent past medical history.    History reviewed. No pertinent surgical history.    Social History  Ms. Rhodes  reports that she has never smoked. She has never been exposed to tobacco smoke. She has never used smokeless tobacco. She reports that she does not currently use alcohol. She reports that she does not use drugs.    Family History  Ms.'s Rhodes family history is not on file.    Review of Systems  Review of Systems   Psychiatric/Behavioral:  Positive for memory loss.    All other systems reviewed and are negative.   Objective:   BP (!) 100/58 (Patient Position: Sitting)   Pulse 67   Resp 18   Ht 5' 2" (1.575 m)   Wt 43.1 kg (95 lb)   SpO2 100%   BMI 17.38 kg/m²    NEUROLOGICAL EXAMINATION:     MENTAL STATUS   Level of consciousness: alert  Knowledge: good.        Dementia      CRANIAL NERVES   Cranial nerves II through XII intact.     MOTOR EXAM        Deconditioned physically      GAIT AND COORDINATION        Wheelchair now can do walker       Physical Exam  Vitals reviewed.   Constitutional:       Appearance: She is normal weight.   Neurological:      Mental Status: Mental status is at baseline.      Cranial Nerves: Cranial nerves 2-12 are intact.        Assessment:     Dementia, unspecified dementia severity, unspecified dementia type, unspecified whether behavioral, psychotic, or mood disturbance or anxiety    Anxiety         Primary Diagnosis and ICD10  Dementia, unspecified dementia severity, unspecified " [facility]   Please relay provider's message below when pt returns call     Lucita Montes RN on 4/14/2025 at 10:09 AM     dementia type, unspecified whether behavioral, psychotic, or mood disturbance or anxiety [F03.90]    Plan:     There are no Patient Instructions on file for this visit.    There are no discontinued medications.    Requested Prescriptions      No prescriptions requested or ordered in this encounter              [1]  Outpatient Encounter Medications as of 4/28/2025   Medication Sig Dispense Refill    diltiaZEM (CARDIZEM CD) 120 MG Cp24 Take 120 mg by mouth.      fluticasone propionate (FLONASE) 50 mcg/actuation nasal spray 2 sprays by Nasal route.      furosemide (LASIX) 40 MG tablet Take 40 mg by mouth.      magnesium oxide (MAG-OX) 400 mg (241.3 mg magnesium) tablet Take 1 tablet by mouth.      metoprolol tartrate (LOPRESSOR) 50 MG tablet       nitrofurantoin, macrocrystal-monohydrate, (MACROBID) 100 MG capsule Take 100 mg by mouth.      QUEtiapine (SEROQUEL) 25 MG Tab       venlafaxine (EFFEXOR) 75 MG tablet Take 75 mg by mouth every evening.      venlafaxine (EFFEXOR-XR) 150 MG Cp24       amLODIPine (NORVASC) 10 MG tablet Take 10 mg by mouth once daily.      ascorbic acid, vitamin C, (VITAMIN C) 1000 MG tablet Take by oral route.      aspirin (ECOTRIN) 81 MG EC tablet Take 81 mg by mouth.      atorvastatin (LIPITOR) 40 MG tablet Take 40 mg by mouth once daily.      azelastine (ASTELIN) 137 mcg (0.1 %) nasal spray 2 sprays (274 mcg total) by Nasal route 2 (two) times daily. (Patient not taking: Reported on 4/28/2025) 30 mL 1    calcium carbonate (OS-ERICKA) 600 mg calcium (1,500 mg) Tab Calcium Carbonate 600 MG Oral Tablet QTY: 0 tablet Days: 0 Refills: 0  Written: 10/26/21 Patient Instructions: Two po qd      denosumab (PROLIA) 60 mg/mL Syrg Prolia 60 MG/ML Subcutaneous Solution Prefilled Syringe QTY: 0  Days: 0 Refills: 0  Written: 09/16/22 Patient Instructions: twice a year      donepeziL (ARICEPT) 5 MG tablet Take 1 tablet (5 mg total) by mouth once daily. 90 tablet 3    ELIQUIS 2.5 mg Tab Take  2.5 mg by mouth 2 (two) times daily.      multivit with min-folic acid 0.4 mg Tab Take 1 tablet by mouth.      olmesartan (BENICAR) 40 MG tablet Take 1 tablet by mouth once daily.      omeprazole (PRILOSEC) 40 MG capsule Take 40 mg by mouth once daily.      potassium chloride SA (K-DUR,KLOR-CON) 20 MEQ tablet Take 20 mEq by mouth once daily.      sertraline (ZOLOFT) 100 MG tablet Take 100 mg by mouth once daily.      tolterodine (DETROL LA) 2 MG Cp24 Take 2 mg by mouth once daily.      vitamin D (VITAMIN D3) 1000 units Tab Take 1,000 Units by mouth.      zolpidem (AMBIEN) 5 MG Tab Take by mouth.       No facility-administered encounter medications on file as of 4/28/2025.